# Patient Record
Sex: FEMALE | Race: WHITE | NOT HISPANIC OR LATINO
[De-identification: names, ages, dates, MRNs, and addresses within clinical notes are randomized per-mention and may not be internally consistent; named-entity substitution may affect disease eponyms.]

---

## 2019-09-24 PROBLEM — Z00.00 ENCOUNTER FOR PREVENTIVE HEALTH EXAMINATION: Status: ACTIVE | Noted: 2019-09-24

## 2019-10-03 ENCOUNTER — APPOINTMENT (OUTPATIENT)
Dept: TRANSPLANT | Facility: CLINIC | Age: 54
End: 2019-10-03
Payer: COMMERCIAL

## 2019-10-03 ENCOUNTER — APPOINTMENT (OUTPATIENT)
Dept: HEPATOLOGY | Facility: CLINIC | Age: 54
End: 2019-10-03
Payer: COMMERCIAL

## 2019-10-03 ENCOUNTER — LABORATORY RESULT (OUTPATIENT)
Age: 54
End: 2019-10-03

## 2019-10-03 VITALS
DIASTOLIC BLOOD PRESSURE: 70 MMHG | TEMPERATURE: 97.9 F | OXYGEN SATURATION: 100 % | HEART RATE: 62 BPM | WEIGHT: 140 LBS | RESPIRATION RATE: 16 BRPM | SYSTOLIC BLOOD PRESSURE: 119 MMHG | HEIGHT: 67 IN | BODY MASS INDEX: 21.97 KG/M2

## 2019-10-03 DIAGNOSIS — Z80.0 FAMILY HISTORY OF MALIGNANT NEOPLASM OF DIGESTIVE ORGANS: ICD-10-CM

## 2019-10-03 DIAGNOSIS — Z87.19 PERSONAL HISTORY OF OTHER DISEASES OF THE DIGESTIVE SYSTEM: ICD-10-CM

## 2019-10-03 DIAGNOSIS — R18.8 OTHER ASCITES: ICD-10-CM

## 2019-10-03 DIAGNOSIS — K72.90 HEPATIC FAILURE, UNSPECIFIED W/OUT COMA: ICD-10-CM

## 2019-10-03 DIAGNOSIS — I86.4 GASTRIC VARICES: ICD-10-CM

## 2019-10-03 DIAGNOSIS — M32.9 SYSTEMIC LUPUS ERYTHEMATOSUS, UNSPECIFIED: ICD-10-CM

## 2019-10-03 PROCEDURE — 99205 OFFICE O/P NEW HI 60 MIN: CPT

## 2019-10-03 RX ORDER — OMEPRAZOLE 40 MG/1
40 CAPSULE, DELAYED RELEASE ORAL DAILY
Refills: 0 | Status: ACTIVE | COMMUNITY
Start: 2019-10-03

## 2019-10-03 RX ORDER — FOLIC ACID 1 MG/1
1 TABLET ORAL DAILY
Refills: 0 | Status: ACTIVE | COMMUNITY
Start: 2019-10-03

## 2019-10-03 RX ORDER — LACTULOSE 10 G/15ML
10 SOLUTION ORAL TWICE DAILY
Refills: 0 | Status: ACTIVE | COMMUNITY
Start: 2019-10-03

## 2019-10-03 NOTE — REVIEW OF SYSTEMS
[Fatigue] : fatigue [Sclera anicteric] : sclera anicteric [Dyspnea on Exertion] : dyspnea on exertion [Joint Pain] : joint pain [Depression] : depression [Anemia] : anemia [Negative] : Integumentary [Fever] : no fever [Chills] : no chills [Night Sweats] : no night sweats [Recent Weight Gain (___ Lbs)] : no recent weight gain [de-identified] : Slowed speech

## 2019-10-03 NOTE — ASSESSMENT
[FreeTextEntry1] : Ms. 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 Hx of GI bleeding from gastric ulcers, but hx of variceal bleeding although had two columns of gastric varices theodore the past. Apparent hx of HE on Lactulsoe and Rifaximine. She is in the middle of a divorce process, but now lives with his boyfriend, and has good support from him. Overall she appears to be good candidate for LT.\par \par PLAN\par -Patient will benefit significant complications from cirrhosis and will benefit with liver transplantations. \par -She has edema in both LE, but has no significant ascites.. Not on any diuretics. She will benefit with a low dose of diuretics. I have recommended Aldactone 50 mg daily, and Lasix 20 mg daily. \par -She is up-to-date with variceal screening She does have gastric varices. She will continue with Nadolol 20 mg daily for primary prophylaxis for variceal bleeding.\par -CT scan from May 2019 showed no HCC. She will need a triple phase CT. This will be done as a part of the preLT evaluation process.\par -No significant cardiac Hc. Will need 3 D ECHO and DSE per our protocol.\par -Continue with Lactulose for hx of HE. She will benefit with addition of Rifaximine 550 mg PO bid. She likely has minimal hepatic encephalopathy.\par -She was counseled for continued alcohol abstinence. I have recommended her continue to join a alcohol support program like [facility] or similar.\par \par -I reviewed the following areas and provided time for questions to be asked and for information to be clarified and/or repeated.\par \par Evaluation process: I reviewed the results of tests and consults available to date and results of physical evaluation. I discussed the tests/consults that are required to complete an evaluation. I discussed the purpose of specific tests/consults, why they are needed, what the test involves, and discussed choices of where tests can be done. I discussed inclusion and exclusion criteria for organ transplant candidacy at [facility] and provided a copy of selection criteria, if requested. I advised her if alternative treatment options are available to them. I discussed the importance of abstinence from illicit, recreational, and prescriptive drug use. I discussed the importance of abstinence from alcohol use. I discussed the necessity of following a strict medical regimen post-transplant. I discussed the importance of an adequate support system to assist patient through evaluation, listing, and transplant process. The candidate was given the opportunity to ask questions.\par \par Candidate selection: I discussed the Recipient Review Committee (RRC) meeting process and consensus decision making of the team. I discussed that the team will decide if an organ transplant is indicated and if the patient is a suitable candidate medically, surgically, psychosocially, and financially. If alternative treatments are identified by the team, this will be discussed with the patient. The candidate was given the opportunity to ask questions.\par Waiting list: I discussed liver allocation and MELD/PELD system, prioritization on the waiting list, and average waiting time for patients based on their disease etiology. I discussed the need for periodic MELD/PELD recertification per OPTN/UNOS regulations and the consequence of not completing the requested tests/procedures on time. I discussed multiple listing options and the option to transfer her waiting time to another center. I discussed the possible progression and complications of their disease, and the possibility that she may not be a candidate in the future for organ transplant. I discussed with the patient that if she uses alcohol or any recreational drugs while on the waiting list or if she is non-adherent with follow-up or medications, she will be removed from the transplant waiting list. I advised the patient of her right to withdraw consent for transplant at any time. The candidate was given the opportunity to ask questions.\par \par Complications of liver disease: I discussed signs and symptoms of progressive liver disease or complications requiring medical attention. I discussed the complication of ascites/edema and generalized fluid overload. I discussed the complication of infections and spontaneous bacterial peritonitis. I discussed the development/worsening of renal insufficiency. I discussed generalized malaise, fatigue, and muscle wasting. I discussed encephalopathy, including the prevention, treatment, and worsening of the condition. I discussed GI bleeding, which is a medical emergency if present, and the need for urgent/emergent medical care. I discussed the possibility of development or worsening of HCC while on the waiting list. I discussed the possibility of progression of HCC beyond criteria for liver transplant. I reviewed the procedure for HCC cases when a donor liver becomes available, namely that the patient will be taken to the OR and explored prior to transplant. If any tumor is found outside of the liver, the transplant procedure will be cancelled and the abdomen will be closed. The candidate was given the opportunity to ask questions.\par \par RTC in 1 month.

## 2019-10-03 NOTE — REASON FOR VISIT
[Initial] : an initial visit for [Liver Transplant Evaluation] : liver transplant evaluation [Other: _____] : [unfilled] [FreeTextEntry2] : Dr. Matta

## 2019-10-03 NOTE — PHYSICAL EXAM
[No Thyromegaly] : no thyromegaly [Clear to Auscultation] : lungs were clear to auscultation bilaterally [Systolic Murmur] : systolic murmur [Splenomegaly] : splenomegaly [Abdominal Ascites] : abdominal ascites [Ascites Tense] : ascites tense [Liver Palpable ___ Finger Breadths Below Costal Margin] : liver palpable [unfilled]  finger breadths below costal margin [Irregular] : Liver Edge: irregular [Non-Tender] : Liver Edge: non-tender [Normal Bowel Sounds] : normal bowel sounds [Previous Abdominal Surgery] : previous abdominal surgery [] : right dorsalis pedis diminished [Number Connection Test: ___(sec)] : number connection test: [unfilled] (sec) [Hepatic Encephalopathy] : hepatic encephalopathy [Depression] : depression [Scleral Icterus] : no scleral icterus [Hepatojugular Reflux] : no hepatojugular reflux [Caput Medusae] : no caput medusae [Umbilical Hernia] : no umbilical hernia [Asterixis] : no asterixis [de-identified] : slowed speech [de-identified] : 2-3 vasyl [FreeTextEntry1] : 2 + edema [de-identified] : slowed speech

## 2019-10-03 NOTE — HISTORY OF PRESENT ILLNESS
[Alcoholic Liver Disease] : Alcoholic Liver Disease [Other: ___] : [unfilled] [Ascites] : Ascites [Non-Bleeding Varices] : Non-Bleeding Varices [Hypertension] : History of hypertension [History of HCC] : No history of hepatocellular carcinoma [Previous Transplant] : No history of previous transplant [Diabetes] : No history of diabetes [Coronary Artery Disease] : No history of coronary artery disease [Previous MI] : No history of previous MI [FreeTextEntry1] : 14 [FreeTextEntry2] : A [TextBox_42] : Ms. Rita Burns is a 53 yr old white female who presents for initial liver transplant evaluation. She was referred by Dr. De Jesus at [facility] in [place], [place].\par \par Patient has long hx of alcohol use, and was diagnosed with cirrhosis of the liver about 1.5 years ago. She was working as a  although her life. She started drinking at age 39, 4 glasses of wine every night and increased to 1 bottle of wine with some vodka a night.\par \par She was first hospitalization Jan 2018 for jaundice, and than stopped alcohol for a short time, and then resumed in the fall 2018. She subsequently hospitalized again Jan 2019 with fluid overload. She had paracentesis and thoracentesis done at that time. She went into Rehab in March 2019 after hospitalization. Continue to go until August 2019, and attended [facility]. She claims she has not had any drinks since January 2019.\par \par Most recently hospitalized in late May 2019 at [facility] with fluid overload, ascites and pleural effusion. She had thoracentesis as well as paracentesis and was discharged on diuretics. Last seen by Dr. De Jesus on 7/2019 who performed endoscopy and colonoscopy. \par \krystyna Also had a hospitalization in Feb 2019 for near syncopal episodes and black stools, weakness. During this hospitalization, hgb: 5.4, plt: 56, INR: 7.9. Received PRBC x 3, FFP 2, platelets 1. Bleeding thought to be from gastric ulcers.\par \par Blood type: A. \par \par Past history of lupus and RA treated with steroids and Imuran. She brought the diagnosis if autoimmune hepatitis, but she was not sure. \par \par She denies any illicit drug use. No DWI. Mother and brother were heavy drinkers. \par \par Diagnosed with Lupus in 2017 when she was having joint pain. She was on prednisone and Imuran. Started Imuran 50 mg BID since June 2019. \par \par Activity Level: \par \par Former smoker in his younger years. Stopped smoking at age 20. No drug use. \par Has 1 son and 1 daughter (24 and 28 years old). Christopher and Linda, boyfriend Jim Gil.  at this time. Lives w/ boyfriend. \par \par No history of diabetes, History of hypertension, No history of coronary artery disease, No history of previous MI \par \par \par Hx: Lupus, Anemia, HTN, RA\par \par PSH: Lap Cholecystectomy (2008), Appendectomy (2010). \par \par Not working at this time. Used to work at [employer] and as a  for [employer]. \par \par FMH: Uncle w/ colon CA\par \par Allergies: NKDA\par \par GI: Dr. Matta / Dr. Rodriguez\par Rheum: \par \par Work-up Reviewed:\par -EGD: 2/12/19: Three clean-based gastric ulcers and 2 varices noted in gastric fundus, No bleeding, no esophageal varices. \par -Colonoscopy: 7/25/19: Internal hemorrhoids. Normal study otherwise. No specimens collected. Repeat in 10 years. \par -EGD: 7/25/2019-repurt n/a for review\par -Paracentesis, 5/17/19: 3L removed.\par -CT abd/p w/contrast, 5/16/19: Cirrhotic liver w/ large amount of ascites, large right pleural effusion and varicosities particularly gastric and paraesophageal.Nodular cirrhotic liver with no focal mass. Mild splenomegaly. Incidental 1.6cm exophytic fibroid off the anterior upper uterus. \par \par -MRI abdomen, 3/22/18: Hepatomegaly w/ diffuse fatty change. No biliary dialation. Evidence of chronic pancreatitis with parenchymal atrophy and peripancreatic fat stranding. Portal HTN. \par \par Meds: Omeprazole 40 QD, Lactulose 20g BID not taking, Imuran 50mg BID, Nadolol 20mg PRN, Thiamine, Folic Acid, B-complex, Potassium Chloride while using Lasix. Was also on spironolactone. Will renew with Sanjeev Eason. \par \par Labs (7/16/19): Cr: 0.82, Na: 138, TBilli: 3.7, INR: 1.3, AST: 40, ALT: 16, WBC: 3.3, H/H: 9.2/25.7, Plt: 83, AFP: 5.0, \par Ca 19-9: 111 (H) – Feb 2019

## 2019-10-09 LAB
ABO + RH PNL BLD: NORMAL
AFP-TM SERPL-MCNC: 3.3 NG/ML
ALBUMIN SERPL ELPH-MCNC: 3.2 G/DL
ALP BLD-CCNC: 91 U/L
ALT SERPL-CCNC: 7 U/L
ANION GAP SERPL CALC-SCNC: 12 MMOL/L
APPEARANCE: ABNORMAL
AST SERPL-CCNC: 26 U/L
BACTERIA: ABNORMAL
BASOPHILS # BLD AUTO: 0.03 K/UL
BASOPHILS NFR BLD AUTO: 1 %
BILIRUB SERPL-MCNC: 4.5 MG/DL
BILIRUBIN URINE: NEGATIVE
BLOOD URINE: NORMAL
BUN SERPL-MCNC: 5 MG/DL
C3 SERPL-MCNC: 73 MG/DL
C4 SERPL-MCNC: 10 MG/DL
CALCIUM SERPL-MCNC: 9.3 MG/DL
CANCER AG19-9 SERPL-ACNC: 20 U/ML
CHLORIDE SERPL-SCNC: 105 MMOL/L
CHOLEST SERPL-MCNC: 209 MG/DL
CHOLEST/HDLC SERPL: 2.7 RATIO
CMV IGG SERPL QL: <0.2 U/ML
CMV IGG SERPL-IMP: NEGATIVE
CO2 SERPL-SCNC: 24 MMOL/L
COLOR: YELLOW
CREAT SERPL-MCNC: 0.52 MG/DL
DRUG ABUSE PANEL-9, SERUM: NORMAL
DSDNA AB SER-ACNC: <12 IU/ML
EBV EA AB SER IA-ACNC: 55.8 U/ML
EBV EA AB TITR SER IF: POSITIVE
EBV EA IGG SER QL IA: >600 U/ML
EBV EA IGG SER-ACNC: POSITIVE
EBV EA IGM SER IA-ACNC: NEGATIVE
EBV PATRN SPEC IB-IMP: NORMAL
EBV VCA IGG SER IA-ACNC: 548 U/ML
EBV VCA IGM SER QL IA: <10 U/ML
EOSINOPHIL # BLD AUTO: 0.09 K/UL
EOSINOPHIL NFR BLD AUTO: 3 %
EPSTEIN-BARR VIRUS CAPSID ANTIGEN IGG: POSITIVE
ESTIMATED AVERAGE GLUCOSE: <68 MG/DL
ETHANOL BLD-MCNC: <10 MG/DL
GLUCOSE QUALITATIVE U: NEGATIVE
GLUCOSE SERPL-MCNC: 90 MG/DL
HBA1C MFR BLD HPLC: <4 %
HBV CORE IGG+IGM SER QL: NONREACTIVE
HBV SURFACE AB SER QL: NONREACTIVE
HBV SURFACE AG SER QL: NONREACTIVE
HCG SERPL-MCNC: 1 MIU/ML
HCT VFR BLD CALC: 27.4 %
HCV AB SER QL: NONREACTIVE
HCV S/CO RATIO: 0.53 S/CO
HDLC SERPL-MCNC: 78 MG/DL
HEPATITIS A IGG ANTIBODY: REACTIVE
HGB BLD-MCNC: 9.3 G/DL
HIV1+2 AB SPEC QL IA.RAPID: NONREACTIVE
HSV 1+2 IGG SER IA-IMP: NEGATIVE
HSV 1+2 IGG SER IA-IMP: POSITIVE
HSV1 IGG SER QL: 0.37 INDEX
HSV2 IGG SER QL: 4.37 INDEX
HYALINE CASTS: 1 /LPF
IMM GRANULOCYTES NFR BLD AUTO: 0 %
INR PPP: 1.68 RATIO
KETONES URINE: NEGATIVE
LDLC SERPL CALC-MCNC: 112 MG/DL
LEUKOCYTE ESTERASE URINE: ABNORMAL
LYMPHOCYTES # BLD AUTO: 1.19 K/UL
LYMPHOCYTES NFR BLD AUTO: 40.2 %
M TB IFN-G BLD-IMP: ABNORMAL
MAGNESIUM SERPL-MCNC: 1.3 MG/DL
MAN DIFF?: NORMAL
MCHC RBC-ENTMCNC: 33.9 GM/DL
MCHC RBC-ENTMCNC: 37.5 PG
MCV RBC AUTO: 110.5 FL
MICROSCOPIC-UA: NORMAL
MONOCYTES # BLD AUTO: 0.26 K/UL
MONOCYTES NFR BLD AUTO: 8.8 %
NEUTROPHILS # BLD AUTO: 1.39 K/UL
NEUTROPHILS NFR BLD AUTO: 47 %
NITRITE URINE: NEGATIVE
PH URINE: 7.5
PHOSPHATE SERPL-MCNC: 3.5 MG/DL
PLATELET # BLD AUTO: 126 K/UL
POTASSIUM SERPL-SCNC: 3.2 MMOL/L
PROT SERPL-MCNC: 7.4 G/DL
PROTEIN URINE: NEGATIVE
PT BLD: 19.5 SEC
QUANTIFERON TB PLUS MITOGEN MINUS NIL: 0.31 IU/ML
QUANTIFERON TB PLUS NIL: 0.01 IU/ML
QUANTIFERON TB PLUS TB1 MINUS NIL: 0 IU/ML
QUANTIFERON TB PLUS TB2 MINUS NIL: 0 IU/ML
RBC # BLD: 2.48 M/UL
RBC # FLD: 15.4 %
RED BLOOD CELLS URINE: 1 /HPF
RUBV IGG FLD-ACNC: 27.9 INDEX
RUBV IGG SER-IMP: POSITIVE
SODIUM SERPL-SCNC: 141 MMOL/L
SPECIFIC GRAVITY URINE: 1.01
SQUAMOUS EPITHELIAL CELLS: 1 /HPF
T GONDII AB SER-IMP: NEGATIVE
T GONDII IGG SER QL: 5.1 IU/ML
T PALLIDUM AB SER QL IA: NEGATIVE
TRIGL SERPL-MCNC: 94 MG/DL
URINE COMMENTS: NORMAL
UROBILINOGEN URINE: ABNORMAL
VZV AB TITR SER: POSITIVE
VZV IGG SER IF-ACNC: 2031 INDEX
WBC # FLD AUTO: 2.96 K/UL
WHITE BLOOD CELLS URINE: 170 /HPF

## 2019-10-10 LAB
ALCOHOL BIOMARKERS QUANT, URINE: NORMAL NG/ML
ETHYL SULFATE: NORMAL NG/ML
ZZALCOHOL BIOMARKERS QUANT UR: NEGATIVE

## 2019-10-28 NOTE — CONSULT LETTER
[DrPaulino  ___] : Dr. DUNN [___] : [unfilled] [Consult Letter:] : I had the pleasure of evaluating your patient, [unfilled]. [( Thank you for referring [unfilled] for consultation for _____ )] : Thank you for referring [unfilled] for consultation for [unfilled] [Sincerely,] : Sincerely, [Consult Closing:] : Thank you very much for allowing me to participate in the care of this patient.  If you have any questions, please do not hesitate to contact me. [FreeTextEntry3] : Paul Miner

## 2019-10-28 NOTE — PHYSICAL EXAM
[No Thyromegaly] : no thyromegaly [Clear to Auscultation] : lungs were clear to auscultation bilaterally [Systolic Murmur] : systolic murmur [Splenomegaly] : splenomegaly [Abdominal Ascites] : abdominal ascites [Ascites Tense] : ascites tense [Liver Palpable ___ Finger Breadths Below Costal Margin] : liver palpable [unfilled]  finger breadths below costal margin [Non-Tender] : Liver Edge: non-tender [Irregular] : Liver Edge: irregular [Normal Bowel Sounds] : normal bowel sounds [Previous Abdominal Surgery] : previous abdominal surgery [] : right dorsalis pedis diminished [Number Connection Test: ___(sec)] : number connection test: [unfilled] (sec) [Hepatic Encephalopathy] : hepatic encephalopathy [Depression] : depression [Scleral Icterus] : no scleral icterus [Hepatojugular Reflux] : no hepatojugular reflux [Caput Medusae] : no caput medusae [Umbilical Hernia] : no umbilical hernia [Asterixis] : no asterixis [de-identified] : slowed speech, HE poor on 7's [de-identified] : 2-3 vasyl [de-identified] : slowed speech [FreeTextEntry1] : ARLD with  RA , ?Lupus past depression, Paxil. Presently with divorce in progress. Multiple hospitalizations, 1 rehab program. Will workup for OLTX. Very good candidate, continue diuretics and lactulose, reimage to R?O HCC, cardiac workup.

## 2019-10-28 NOTE — END OF VISIT
[>50% of Time Spent on Coordination of Care for  ___] : Greater than 50% of the encounter time was spent on coordination of care for [unfilled] [Time Spent: ___ minutes] : I have spent [unfilled] minutes of face to face time with the patient [FreeTextEntry3] : Sharif and JEOVANNY in attendance

## 2019-10-28 NOTE — HISTORY OF PRESENT ILLNESS
[Alcoholic Liver Disease] : Alcoholic Liver Disease [Other: ___] : [unfilled] [Ascites] : Ascites [Non-Bleeding Varices] : Non-Bleeding Varices [Hypertension] : History of hypertension [History of HCC] : No history of hepatocellular carcinoma [Previous Transplant] : No history of previous transplant [Diabetes] : No history of diabetes [Coronary Artery Disease] : No history of coronary artery disease [Previous MI] : No history of previous MI [FreeTextEntry1] : 14 [FreeTextEntry2] : A [TextBox_42] : Rita Burns is a 53 yr old female who presents for initial liver transplant evaluation etiology ARLD. She was referred by Dr. De Jesus at [facility] in [place], [place].\par \par She has cirrhosis from long time alcohol use. Diagnosed a year and half ago. Started drinking at age 39, 4 glasses of wine every night and increased to 1 bottle of wine with some vodka a night.. First hospitalization Jan 2018 for jaundice.Stopped alcohol for a short time, and then Fall 2018 started drinking again.  Hospitalized again Jan 2019 paracentesis and thoracentesis. No DWI. Mother and brother were  heavy drinkers. Went into Rehab in March 2019 after hospitalization. Continue to go until  August 2019. Stopped drinking alcohol reportedly in January 2019. Past history of lupus and RA treated with steroids and Imuran. \par \par Most recently hospitalized in late May 2019 at [facility] with fluid overload, ascites and pleural effusion. She had thoracentesis as well as paracentesis and was discharged on diuretics. Last seen by Dr. De Jesus on 7/2019 who performed endoscopy and colonoscopy. Blood type: A. \par \par Also had a hospitalization in Feb 2019 for near syncopal episodes and black stools, weakness. During this hospitalization, hgb: 5.4, plt: 56, INR: 7.9. Received PRBC x 3, FFP 2, platelets 1. Bleeding thought to be from gastric ulcers.\par \par Diagnosed with Lupus in 2017 when she was having joint pain. She was on prednisone and Imuran. Started Imuran 50 mg BID since June 2019.  \par \par Activity Level: \par \par Former smoker in his younger years. Stopped smoking at age 20. No drug use.  \par Has 1 son and 1 daughter (24 and 28 years old). Christopher and Linda, boyfriend Jim Gil.  at this time. Lives w/ boyfriend. \par \par Hx: Lupus, Anemia, HTN, RA\par \par PSH: Lap Cholecystectomy (2008), Appendectomy (2010).    \par \par Not working at this time. Used to work at [employer] and as a  for [employer].                          \par \par FMH: Uncle w/ colon CA\par \par Allergies: NKDA\par \par GI: Dr. Matta / Dr. Rodriguez\par Rheum: \par \par Work-up Reviewed:\par -EGD: 2/12/19: Three clean-based gastric ulcers and 2 varices noted in gastric fundus, No bleeding, no esophageal varices. \par -Colonoscopy: 7/25/19: Internal hemorrhoids. Normal study otherwise. No specimens collected. Repeat in 10 years. \par -Paracentesis, 5/17/19: 3L removed.\par -CT abd/p w/contrast, 5/16/19: Cirrhotic liver w/ large amount of ascites, large right pleural effusion and varicosities particularly gastric and paraesophageal .Nodular cirrhotic liver with no focal mass. Mild splenomegaly. Incidental 1.6cm exophytic fibroid off the anterior upper uterus.  \par -MRI abdomen, 3/22/18: Hepatomegaly w/ diffuse fatty change. No biliary dialation. Evidence of chronic pancreatitis with parenchymal atrophy and peripancreatic fat stranding. Portal HTN. \par \par Meds: Omeprazole 40 QD, Lactulose 20g BID not taking, Imuran 50mg BID, Nadolol 20mg PRN, Thiamine, Folic Acid, B-complex, Potassium Chloride while using Lasix. Was also on spironolactone. Will renew with Sanjeev Eason.  \par \par Labs (7/16/19): Cr: 0.82, Na: 138, TBilli: 3.7, INR: 1.3, AST: 40, ALT: 16, WBC: 3.3, H/H: 9.2/25.7, Plt: 83, AFP: 5.0,     \par Ca 19-9: 111 (H) – Feb 2019\par \par

## 2019-10-28 NOTE — REASON FOR VISIT
[Initial] : an initial visit for [Liver Transplant Evaluation] : liver transplant evaluation [Consent for evaluation completed] : Consent for evaluation completed [Source: ________] : History obtained from [unfilled] [FreeTextEntry2] : Dr. De Jesus

## 2019-10-28 NOTE — PLAN
[Patient agrees to proceed with the full evaluation for liver transplantation.] : Patient agrees to proceed with the full evaluation for liver transplantation. [FreeTextEntry1] : Patient agrees to proceed with the full evaluation for liver transplantation. \par \par Patient was explained alternatives, benefits and risk of liver transplantation, including but not limited to infection, bleeding, hepatic artery or portal vein thrombosis, primary dysfunction or primary non-function of the liver allograft, cardiopulmonary arrest, intra-operative death and other surgical, medical and psychosocial risks as outlined in the evaluation consent form.  she understands these risks and is willing to proceed with liver transplantation.\par \par Patient was also explained the need to remain on lifelong anti-rejection medications.  We discussed the risks and side effects of immunosuppressive medications including, but not limited to infection, cancer, weight gain, new onset or worsening of diabetes or hypertension in a temporary or permanent state, kidney dysfunction, water retention, back pain, constipation, diarrhea, dizziness, headache, joint pain, loss of appetite, nausea, stomach pain or upset, trouble sleeping, vomiting, and mental or mood changes.  An overview of the follow-up protocol was reviewed including outpatient visits, blood tests and the potential for hospital readmission. She understands these risks and is willing to proceed with liver transplantation.\par \par We also discussed the available donor organ pool. We discussed the assessment of the  donor including age, cause of death, cardiac arrest, electrolyte abnormalities, course and length of hospital stay, use of vasopressors, hepatitis and HIV testing. We reviewed organ donor risk factors that could affect the success of the graft or the health of the patient, including, but not limited to, the donor's history, condition or age of the organs used, or the patient's potential risk of j luis the human immunodeficiency virus and other infectious diseases if the disease cannot be detected in an infected donor.  We discussed and defined the option of an extended criteria for cadaveric donors (Hepatitis B core Ab positive donor, Hepatitis C Ab positive donor, steatosis, older donors, split livers and DCD donors) and early and late outcomes of graft survival after transplantation.\par \par The options of  donor liver transplantation vs. live donor liver transplantation were discussed with them. Differences between donation after cardiac death (DCD) compared to donation after brain death (DBD) liver transplantation were also fully disclosed and included lower graft survival rates, the increased incidence of hepatic artery stenosis, bile duct injury, ischemic cholangiopathy and increased re transplant rates seen in recipients of DCD donor livers.\par \par The use of the [facility] Public [facility] ([facility]) Guideline has defined some donors as "Increased Risk Donors" based on their history which may suggest socially increased risk behaviors was discussed. The patient is aware that if PHS increased risk donor is offered to the candidate, the transplant team will discuss the specifics to assist with making an informed decision. We discussed our post-transplant protocol of serology testing if the candidate receives an organ that is PHS increased risk.\par \par The patient was made aware that it is against the law to be paid or to pay to donate an organ.  If any money was given or will be given in exchange for receiving an organ, the patient may be subject to criminal prosecution, and any insurance coverage may no longer apply and patient may become personally responsible for all the health care costs associated with the donation, and private health information will be available to law enforcement agencies.\par \par We explained that we store vessels for subsequent later use in transplants.  Again, we discussed the extensive testing done on  donors prior to donation, however despite an extensive evaluation on the donor, there is potential risk a recipient may contract infectious diseases (HIV or Hepatitis) or cancer if they cannot be detected in the donor. In the cases where PHS Increased Risk donor vessels are used, we test the recipient per protocol between 1-2 months post-transplant for any potential infectious disease transmission. The patient understands that there is the potential of use of  donor vessels and PHS increased risk donor vessels. She understands these risks and is willing to receive potentially PHS increased risk donor vessels.\par We also discussed the MELD allocation system in depth and the one-year observed and expected patient and graft survival rates according to data from the Scientific Registry for Transplant Recipients. These center specific outcomes were provided in comparison to the national one-year averages as described in the evaluation consent forms.\par \par Prior to signing consent, patient was given an opportunity to ask questions.  After all concerns were addressed, informed consent was signed. Patient is aware that they may withdraw their consent for transplantation at any time.  Further, the patient is aware of the right to refuse an organ offer without penalty at any time. She has consented for PHS increased risk and not for Hep C offers (?) \par \par

## 2019-11-08 ENCOUNTER — APPOINTMENT (OUTPATIENT)
Dept: RADIOLOGY | Facility: CLINIC | Age: 54
End: 2019-11-08
Payer: COMMERCIAL

## 2019-11-08 PROCEDURE — 71045 X-RAY EXAM CHEST 1 VIEW: CPT

## 2019-11-12 ENCOUNTER — NON-APPOINTMENT (OUTPATIENT)
Age: 54
End: 2019-11-12

## 2019-11-12 ENCOUNTER — APPOINTMENT (OUTPATIENT)
Dept: OBGYN | Facility: CLINIC | Age: 54
End: 2019-11-12
Payer: COMMERCIAL

## 2019-11-12 ENCOUNTER — APPOINTMENT (OUTPATIENT)
Dept: CARDIOLOGY | Facility: CLINIC | Age: 54
End: 2019-11-12
Payer: COMMERCIAL

## 2019-11-12 VITALS
WEIGHT: 122 LBS | SYSTOLIC BLOOD PRESSURE: 116 MMHG | HEIGHT: 67 IN | DIASTOLIC BLOOD PRESSURE: 58 MMHG | BODY MASS INDEX: 19.15 KG/M2

## 2019-11-12 VITALS
BODY MASS INDEX: 19.15 KG/M2 | RESPIRATION RATE: 16 BRPM | HEIGHT: 67 IN | SYSTOLIC BLOOD PRESSURE: 119 MMHG | OXYGEN SATURATION: 100 % | WEIGHT: 122 LBS | TEMPERATURE: 98.1 F | DIASTOLIC BLOOD PRESSURE: 67 MMHG | HEART RATE: 60 BPM

## 2019-11-12 DIAGNOSIS — Z87.898 PERSONAL HISTORY OF OTHER SPECIFIED CONDITIONS: ICD-10-CM

## 2019-11-12 DIAGNOSIS — I10 ESSENTIAL (PRIMARY) HYPERTENSION: ICD-10-CM

## 2019-11-12 DIAGNOSIS — K70.30 ALCOHOLIC CIRRHOSIS OF LIVER W/OUT ASCITES: ICD-10-CM

## 2019-11-12 DIAGNOSIS — Z01.818 ENCOUNTER FOR OTHER PREPROCEDURAL EXAMINATION: ICD-10-CM

## 2019-11-12 DIAGNOSIS — R60.0 LOCALIZED EDEMA: ICD-10-CM

## 2019-11-12 DIAGNOSIS — D64.9 ANEMIA, UNSPECIFIED: ICD-10-CM

## 2019-11-12 PROCEDURE — 93000 ELECTROCARDIOGRAM COMPLETE: CPT

## 2019-11-12 PROCEDURE — 99205 OFFICE O/P NEW HI 60 MIN: CPT

## 2019-11-12 PROCEDURE — 99386 PREV VISIT NEW AGE 40-64: CPT

## 2019-11-12 NOTE — CHIEF COMPLAINT
[Initial Visit] : initial GYN visit [FreeTextEntry1] : here for pre transplant evaluation \par Patient with cirrhosis of the liver

## 2019-11-12 NOTE — PHYSICAL EXAM
[Conjunctiva] : the conjunctiva were normal in both eyes [General Appearance - In No Acute Distress] : no acute distress [PERRL] : pupils were equal in size, round, and reactive to light [Yellow Sclera (Icteric)] : scleral icterus was noted bilaterally [No Oral Pallor] : no oral pallor [Normal Oral Mucosa] : normal oral mucosa [Normal Oropharynx] : normal oropharynx [Normal Jugular Venous A Waves Present] : normal jugular venous A waves present [No Oral Cyanosis] : no oral cyanosis [Normal Jugular Venous V Waves Present] : normal jugular venous V waves present [No Jugular Venous Fernández A Waves] : no jugular venous fernández A waves [No Precordial Heave] : no precordial heave was noted [Normal] : normal [5th Left ICS - MCL] : palpated at the 5th LICS in the midclavicular line [Normal Rate] : normal [Rhythm Regular] : regular [No Gallop] : no gallop heard [Normal S1] : normal S1 [Normal S2] : normal S2 [II] : a grade 2 [I] : a grade 1 [2+] : left 2+ [Right Carotid Bruit] : no bruit heard over the right carotid [Left Carotid Bruit] : no bruit heard over the left carotid [No Pitting Edema] : no pitting edema present [] : no respiratory distress [Bowel Sounds] : normal bowel sounds [Exaggerated Use Of Accessory Muscles For Inspiration] : no accessory muscle use [Auscultation Breath Sounds / Voice Sounds] : lungs were clear to auscultation bilaterally [Respiration, Rhythm And Depth] : normal respiratory rhythm and effort [Abdomen Tenderness] : non-tender [Abdomen Mass (___ Cm)] : no abdominal mass palpated [Abdomen Soft] : soft [Abnormal Walk] : normal gait [Nail Clubbing] : no clubbing of the fingernails [Skin Color & Pigmentation] : normal skin color and pigmentation [Cyanosis, Localized] : no localized cyanosis [No Venous Stasis] : no venous stasis [Oriented To Time, Place, And Person] : oriented to person, place, and time [No Xanthoma] : no  xanthoma was observed [Mood] : the mood was normal [Affect] : the affect was normal [Impaired Insight] : insight and judgment were intact [No Anxiety] : not feeling anxious

## 2019-11-12 NOTE — REASON FOR VISIT
[Initial Evaluation] : an initial evaluation of [FreeTextEntry2] : pretransplant cardiac evaluation prior to possible liver transplant

## 2019-11-12 NOTE — DISCUSSION/SUMMARY
[FreeTextEntry1] : Patient is a 54 year-old woman with multiple autoimmune conditions and alcoholic cirrhosis who presents today for cardiac evaluation prior to possible liver transplant.\par She has no history of cardiovascular disease.\par \par Will check dobutamine stress echo to evaluate structure and function of heart, filling pressures, and assess for ischemia.

## 2019-11-12 NOTE — HISTORY OF PRESENT ILLNESS
[FreeTextEntry1] : Patient is a 54 year-old woman with multiple autoimmune conditions, including SLE and RA, maintained on Imuran (azathioprine), autoimmune hepatitis complicated by alcoholic cirrhosis, with no known cardiovascular disease, who presents for cardiac evaluation prior to possible liver transplant.\par Patient does not exercise regularly, but she reports that she does occasional leg lifts to address her edema.\par She is maintained on furosemide 20 mg daily and spironolactone 50 mg daily. She takes nadolol 20 mg daily PRN for systolic blood pressure over 110 mmHg.\par \par PMD: Timothy Navarro MD [phone]\par GI: Sanjeev De Jesus MD [phone]\par Hematologist: Marquez Lundy MD [phone]\par Rheumatologist: Dwaine Porter MD [phone]

## 2019-11-18 LAB
CYTOLOGY CVX/VAG DOC THIN PREP: ABNORMAL
HPV HIGH+LOW RISK DNA PNL CVX: NOT DETECTED

## 2019-12-02 ENCOUNTER — APPOINTMENT (OUTPATIENT)
Dept: CV DIAGNOSITCS | Facility: HOSPITAL | Age: 54
End: 2019-12-02

## 2020-03-04 ENCOUNTER — RX RENEWAL (OUTPATIENT)
Age: 55
End: 2020-03-04

## 2020-04-02 PROBLEM — R18.8 ASCITES: Status: ACTIVE | Noted: 2019-10-03

## 2020-06-03 ENCOUNTER — RX RENEWAL (OUTPATIENT)
Age: 55
End: 2020-06-03

## 2020-09-09 ENCOUNTER — RX RENEWAL (OUTPATIENT)
Age: 55
End: 2020-09-09

## 2020-09-09 RX ORDER — SPIRONOLACTONE 50 MG/1
50 TABLET ORAL DAILY
Qty: 30 | Refills: 2 | Status: ACTIVE | COMMUNITY
Start: 2019-10-03 | End: 1900-01-01

## 2020-09-10 ENCOUNTER — APPOINTMENT (OUTPATIENT)
Dept: MAMMOGRAPHY | Facility: CLINIC | Age: 55
End: 2020-09-10
Payer: COMMERCIAL

## 2020-09-10 PROCEDURE — 77067 SCR MAMMO BI INCL CAD: CPT

## 2020-09-10 PROCEDURE — 77063 BREAST TOMOSYNTHESIS BI: CPT

## 2020-12-07 ENCOUNTER — RX RENEWAL (OUTPATIENT)
Age: 55
End: 2020-12-07

## 2021-03-09 ENCOUNTER — APPOINTMENT (OUTPATIENT)
Dept: MRI IMAGING | Facility: CLINIC | Age: 56
End: 2021-03-09
Payer: COMMERCIAL

## 2021-03-09 PROCEDURE — A9585: CPT | Mod: JW

## 2021-03-09 PROCEDURE — A9585C: CUSTOM

## 2021-03-09 PROCEDURE — 74183 MRI ABD W/O CNTR FLWD CNTR: CPT

## 2021-11-17 ENCOUNTER — APPOINTMENT (OUTPATIENT)
Dept: OPHTHALMOLOGY | Facility: CLINIC | Age: 56
End: 2021-11-17
Payer: COMMERCIAL

## 2021-11-17 ENCOUNTER — NON-APPOINTMENT (OUTPATIENT)
Age: 56
End: 2021-11-17

## 2021-11-17 PROCEDURE — 92004 COMPRE OPH EXAM NEW PT 1/>: CPT

## 2021-12-01 ENCOUNTER — APPOINTMENT (OUTPATIENT)
Dept: OPHTHALMOLOGY | Facility: CLINIC | Age: 56
End: 2021-12-01
Payer: COMMERCIAL

## 2021-12-01 ENCOUNTER — NON-APPOINTMENT (OUTPATIENT)
Age: 56
End: 2021-12-01

## 2021-12-01 PROCEDURE — 92134 CPTRZ OPH DX IMG PST SGM RTA: CPT

## 2021-12-01 PROCEDURE — 92083 EXTENDED VISUAL FIELD XM: CPT

## 2021-12-07 ENCOUNTER — APPOINTMENT (OUTPATIENT)
Dept: MAMMOGRAPHY | Facility: CLINIC | Age: 56
End: 2021-12-07
Payer: COMMERCIAL

## 2021-12-07 PROCEDURE — 77063 BREAST TOMOSYNTHESIS BI: CPT

## 2021-12-07 PROCEDURE — 77067 SCR MAMMO BI INCL CAD: CPT

## 2021-12-27 ENCOUNTER — NON-APPOINTMENT (OUTPATIENT)
Age: 56
End: 2021-12-27

## 2022-02-04 ENCOUNTER — NON-APPOINTMENT (OUTPATIENT)
Age: 57
End: 2022-02-04

## 2022-02-08 ENCOUNTER — APPOINTMENT (OUTPATIENT)
Dept: OBGYN | Facility: CLINIC | Age: 57
End: 2022-02-08
Payer: COMMERCIAL

## 2022-02-08 ENCOUNTER — NON-APPOINTMENT (OUTPATIENT)
Age: 57
End: 2022-02-08

## 2022-02-08 VITALS
RESPIRATION RATE: 16 BRPM | HEIGHT: 67 IN | WEIGHT: 134 LBS | SYSTOLIC BLOOD PRESSURE: 100 MMHG | BODY MASS INDEX: 21.03 KG/M2 | DIASTOLIC BLOOD PRESSURE: 68 MMHG

## 2022-02-08 DIAGNOSIS — Z78.9 OTHER SPECIFIED HEALTH STATUS: ICD-10-CM

## 2022-02-08 DIAGNOSIS — Z82.49 FAMILY HISTORY OF ISCHEMIC HEART DISEASE AND OTHER DISEASES OF THE CIRCULATORY SYSTEM: ICD-10-CM

## 2022-02-08 DIAGNOSIS — M06.9 RHEUMATOID ARTHRITIS, UNSPECIFIED: ICD-10-CM

## 2022-02-08 DIAGNOSIS — Z01.419 ENCOUNTER FOR GYNECOLOGICAL EXAMINATION (GENERAL) (ROUTINE) W/OUT ABNORMAL FINDINGS: ICD-10-CM

## 2022-02-08 PROCEDURE — 99396 PREV VISIT EST AGE 40-64: CPT

## 2022-02-08 RX ORDER — NADOLOL 20 MG/1
20 TABLET ORAL DAILY
Refills: 0 | Status: DISCONTINUED | COMMUNITY
Start: 2019-10-03 | End: 2022-02-08

## 2022-02-08 RX ORDER — PAROXETINE HYDROCHLORIDE 10 MG/1
10 TABLET, FILM COATED ORAL DAILY
Refills: 0 | Status: DISCONTINUED | COMMUNITY
Start: 2019-10-03 | End: 2022-02-08

## 2022-02-08 RX ORDER — RIFAXIMIN 550 MG/1
550 TABLET ORAL
Qty: 180 | Refills: 1 | Status: DISCONTINUED | COMMUNITY
Start: 2019-10-03 | End: 2022-02-08

## 2022-02-08 RX ORDER — FUROSEMIDE 20 MG/1
20 TABLET ORAL DAILY
Qty: 30 | Refills: 1 | Status: DISCONTINUED | COMMUNITY
Start: 2019-10-03 | End: 2022-02-08

## 2022-02-08 RX ORDER — AZATHIOPRINE 50 1/1
50 TABLET ORAL TWICE DAILY
Refills: 0 | Status: DISCONTINUED | COMMUNITY
Start: 2019-10-03 | End: 2022-02-08

## 2022-02-08 RX ORDER — GABAPENTIN 100 MG
100 TABLET ORAL
Refills: 0 | Status: ACTIVE | COMMUNITY

## 2022-02-11 NOTE — HISTORY OF PRESENT ILLNESS
[Patient reported mammogram was normal] : Patient reported mammogram was normal [Patient reported PAP Smear was normal] : Patient reported PAP Smear was normal [Patient reported colonoscopy was normal] : Patient reported colonoscopy was normal [postmenopausal] : postmenopausal [N] : Patient is not sexually active [Y] : Positive pregnancy history [TextBox_4] : LMP: 2017\par \par 55 yo here for well woman exam. No complaints today. Declines hx of alcohol abuse in past when chart reviewed. [Mammogramdate] : 2021 [PapSmeardate] : 2019 [TextBox_31] : hpv negative [ColonoscopyDate] : 2019 [LMPDate] : 2017 [PGxTotal] : 6 [[facility] [[facility] [PGHxABInduced] : 2 [PGHxABSpont] : 2 [FreeTextEntry1] : NSVDx2. Denies cysts, fibroids, abnormal pap, STI.

## 2022-02-11 NOTE — PHYSICAL EXAM
[Chaperone Present] : A chaperone was present in the examining room during all aspects of the physical examination [FreeTextEntry1] : GAGAN Henriquez  [Appropriately responsive] : appropriately responsive [Alert] : alert [No Acute Distress] : no acute distress [No Lymphadenopathy] : no lymphadenopathy [Soft] : soft [Non-tender] : non-tender [Non-distended] : non-distended [No HSM] : No HSM [No Lesions] : no lesions [No Mass] : no mass [Oriented x3] : oriented x3 [Examination Of The Breasts] : a normal appearance [No Discharge] : no discharge [No Masses] : no breast masses were palpable [Labia Majora] : normal [Labia Minora] : normal [Normal] : normal [Tenderness] : nontender [Enlarged ___ wks] : not enlarged [Uterine Adnexae] : normal

## 2022-02-11 NOTE — COUNSELING
[Drugs/Alcohol] : drugs, alcohol [Breast Self Exam] : breast self exam [Confidentiality] : confidentiality

## 2022-02-11 NOTE — DISCUSSION/SUMMARY
[FreeTextEntry1] : 55 yo here for well woman exam. \par  \par Pap + HPV\par Mammogram and colonoscopy up to date \par Return in 1 yr or prn

## 2022-02-14 LAB
CYTOLOGY CVX/VAG DOC THIN PREP: NORMAL
HPV HIGH+LOW RISK DNA PNL CVX: NOT DETECTED

## 2022-05-01 ENCOUNTER — EMERGENCY (EMERGENCY)
Facility: HOSPITAL | Age: 57
LOS: 1 days | Discharge: HOME | End: 2022-05-01
Admitting: EMERGENCY MEDICINE
Payer: MEDICAID
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
## 2022-05-01 DIAGNOSIS — M25.562 PAIN IN LEFT KNEE: ICD-10-CM
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
## 2022-05-01 DIAGNOSIS — M06.9 RHEUMATOID ARTHRITIS, UNSPECIFIED: ICD-10-CM
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
## 2022-05-01 DIAGNOSIS — Z04.3 ENCOUNTER FOR EXAMINATION AND OBSERVATION FOLLOWING OTHER ACCIDENT: ICD-10-CM

## 2022-05-01 DIAGNOSIS — M25.561 PAIN IN RIGHT KNEE: ICD-10-CM
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
## 2022-05-01 DIAGNOSIS — Z90.49 ACQUIRED ABSENCE OF OTHER SPECIFIED PARTS OF DIGESTIVE TRACT: ICD-10-CM

## 2022-05-01 DIAGNOSIS — S80.811A ABRASION, RIGHT LOWER LEG, INITIAL ENCOUNTER: ICD-10-CM

## 2022-05-01 DIAGNOSIS — F10.129 ALCOHOL ABUSE WITH INTOXICATION, UNSPECIFIED: ICD-10-CM

## 2022-05-01 PROCEDURE — 71045 X-RAY EXAM CHEST 1 VIEW: CPT | Mod: 26

## 2022-05-01 PROCEDURE — 70450 CT HEAD/BRAIN W/O DYE: CPT | Mod: 26,MA

## 2022-05-01 PROCEDURE — 71260 CT THORAX DX C+: CPT | Mod: 26,MA

## 2022-05-01 PROCEDURE — 73562 X-RAY EXAM OF KNEE 3: CPT | Mod: 26,RT,76

## 2022-05-01 PROCEDURE — 99285 EMERGENCY DEPT VISIT HI MDM: CPT

## 2022-05-01 PROCEDURE — 72125 CT NECK SPINE W/O DYE: CPT | Mod: 26,MA

## 2022-05-01 PROCEDURE — 72170 X-RAY EXAM OF PELVIS: CPT | Mod: 26

## 2022-05-01 PROCEDURE — 93010 ELECTROCARDIOGRAM REPORT: CPT

## 2022-05-01 PROCEDURE — 74177 CT ABD & PELVIS W/CONTRAST: CPT | Mod: 26,MA

## 2022-05-18 ENCOUNTER — APPOINTMENT (OUTPATIENT)
Dept: OPHTHALMOLOGY | Facility: CLINIC | Age: 57
End: 2022-05-18

## 2022-09-12 ENCOUNTER — APPOINTMENT (OUTPATIENT)
Dept: RADIOLOGY | Facility: CLINIC | Age: 57
End: 2022-09-12

## 2023-07-28 ENCOUNTER — NON-APPOINTMENT (OUTPATIENT)
Age: 58
End: 2023-07-28

## 2023-11-15 ENCOUNTER — APPOINTMENT (OUTPATIENT)
Dept: ULTRASOUND IMAGING | Facility: CLINIC | Age: 58
End: 2023-11-15
Payer: COMMERCIAL

## 2023-11-15 PROCEDURE — 76700 US EXAM ABDOM COMPLETE: CPT

## 2024-01-27 ENCOUNTER — NON-APPOINTMENT (OUTPATIENT)
Age: 59
End: 2024-01-27

## 2024-03-25 ENCOUNTER — NON-APPOINTMENT (OUTPATIENT)
Age: 59
End: 2024-03-25

## 2024-05-19 ENCOUNTER — NON-APPOINTMENT (OUTPATIENT)
Age: 59
End: 2024-05-19

## 2024-07-12 ENCOUNTER — APPOINTMENT (OUTPATIENT)
Dept: MAMMOGRAPHY | Facility: CLINIC | Age: 59
End: 2024-07-12
Payer: COMMERCIAL

## 2024-07-12 PROCEDURE — 77063 BREAST TOMOSYNTHESIS BI: CPT

## 2024-07-12 PROCEDURE — 77067 SCR MAMMO BI INCL CAD: CPT

## 2025-04-01 ENCOUNTER — APPOINTMENT (OUTPATIENT)
Dept: RADIOLOGY | Facility: CLINIC | Age: 60
End: 2025-04-01
Payer: COMMERCIAL

## 2025-04-01 PROCEDURE — 72202 X-RAY EXAM SI JOINTS 3/> VWS: CPT

## 2025-04-01 PROCEDURE — 73562 X-RAY EXAM OF KNEE 3: CPT | Mod: 50

## 2025-04-01 PROCEDURE — 73521 X-RAY EXAM HIPS BI 2 VIEWS: CPT

## 2025-04-01 PROCEDURE — 77080 DXA BONE DENSITY AXIAL: CPT

## 2025-04-01 PROCEDURE — 72110 X-RAY EXAM L-2 SPINE 4/>VWS: CPT

## 2025-07-24 ENCOUNTER — APPOINTMENT (OUTPATIENT)
Dept: RADIOLOGY | Facility: CLINIC | Age: 60
End: 2025-07-24
Payer: COMMERCIAL

## 2025-07-24 PROCEDURE — 73130 X-RAY EXAM OF HAND: CPT | Mod: 50
